# Patient Record
Sex: MALE | Race: WHITE | Employment: FULL TIME | URBAN - METROPOLITAN AREA
[De-identification: names, ages, dates, MRNs, and addresses within clinical notes are randomized per-mention and may not be internally consistent; named-entity substitution may affect disease eponyms.]

---

## 2018-01-04 ENCOUNTER — OFFICE VISIT (OUTPATIENT)
Dept: OTHER | Facility: HOSPITAL | Age: 38
End: 2018-01-04
Attending: PREVENTIVE MEDICINE

## 2018-01-04 ENCOUNTER — HOSPITAL ENCOUNTER (OUTPATIENT)
Dept: GENERAL RADIOLOGY | Facility: HOSPITAL | Age: 38
Discharge: HOME | End: 2018-01-04
Attending: PREVENTIVE MEDICINE

## 2018-01-04 DIAGNOSIS — Z00.00 ANNUAL PHYSICAL EXAM: Primary | ICD-10-CM

## 2018-01-04 LAB
ALBUMIN SERPL-MCNC: 4.4 G/DL (ref 3.5–4.8)
ALP LIVER SERPL-CCNC: 53 U/L (ref 45–117)
ALT SERPL-CCNC: 39 U/L (ref 17–63)
AST SERPL-CCNC: 21 U/L (ref 15–41)
ATRIAL RATE: 71 BPM
BASOPHILS # BLD AUTO: 0.03 X10(3) UL (ref 0–0.1)
BASOPHILS NFR BLD AUTO: 0.6 %
BILIRUB SERPL-MCNC: 0.7 MG/DL (ref 0.1–2)
BILIRUB UR QL STRIP.AUTO: NEGATIVE
BUN BLD-MCNC: 11 MG/DL (ref 8–20)
CALCIUM BLD-MCNC: 9.4 MG/DL (ref 8.3–10.3)
CHLORIDE: 104 MMOL/L (ref 101–111)
CHOLEST SMN-MCNC: 234 MG/DL (ref ?–200)
CLARITY UR REFRACT.AUTO: CLEAR
CO2: 30 MMOL/L (ref 22–32)
CREAT BLD-MCNC: 1.04 MG/DL (ref 0.7–1.3)
EOSINOPHIL # BLD AUTO: 0.08 X10(3) UL (ref 0–0.3)
EOSINOPHIL NFR BLD AUTO: 1.5 %
ERYTHROCYTE [DISTWIDTH] IN BLOOD BY AUTOMATED COUNT: 12 % (ref 11.5–16)
GLUCOSE BLD-MCNC: 91 MG/DL (ref 70–99)
GLUCOSE UR STRIP.AUTO-MCNC: NEGATIVE MG/DL
HCT VFR BLD AUTO: 48.3 % (ref 37–53)
HDLC SERPL-MCNC: 62 MG/DL (ref 45–?)
HDLC SERPL: 3.77 {RATIO} (ref ?–4.97)
HGB BLD-MCNC: 16.4 G/DL (ref 13–17)
IMMATURE GRANULOCYTE COUNT: 0.01 X10(3) UL (ref 0–1)
IMMATURE GRANULOCYTE RATIO %: 0.2 %
KETONES UR STRIP.AUTO-MCNC: NEGATIVE MG/DL
LDLC SERPL CALC-MCNC: 155 MG/DL (ref ?–130)
LEUKOCYTE ESTERASE UR QL STRIP.AUTO: NEGATIVE
LYMPHOCYTES # BLD AUTO: 2.07 X10(3) UL (ref 0.9–4)
LYMPHOCYTES NFR BLD AUTO: 38.5 %
M PROTEIN MFR SERPL ELPH: 8.2 G/DL (ref 6.1–8.3)
MCH RBC QN AUTO: 29.9 PG (ref 27–33.2)
MCHC RBC AUTO-ENTMCNC: 34 G/DL (ref 31–37)
MCV RBC AUTO: 88.1 FL (ref 80–99)
MONOCYTES # BLD AUTO: 0.46 X10(3) UL (ref 0.1–0.6)
MONOCYTES NFR BLD AUTO: 8.6 %
NEUTROPHIL ABS PRELIM: 2.72 X10 (3) UL (ref 1.3–6.7)
NEUTROPHILS # BLD AUTO: 2.72 X10(3) UL (ref 1.3–6.7)
NEUTROPHILS NFR BLD AUTO: 50.6 %
NITRITE UR QL STRIP.AUTO: NEGATIVE
NONHDLC SERPL-MCNC: 172 MG/DL (ref ?–130)
P AXIS: 50 DEGREES
P-R INTERVAL: 156 MS
PH UR STRIP.AUTO: 7 [PH] (ref 4.5–8)
PLATELET # BLD AUTO: 232 10(3)UL (ref 150–450)
POTASSIUM SERPL-SCNC: 4.2 MMOL/L (ref 3.6–5.1)
PROT UR STRIP.AUTO-MCNC: NEGATIVE MG/DL
Q-T INTERVAL: 394 MS
QRS DURATION: 84 MS
QTC CALCULATION (BEZET): 428 MS
R AXIS: 79 DEGREES
RBC # BLD AUTO: 5.48 X10(6)UL (ref 4.3–5.7)
RBC UR QL AUTO: NEGATIVE
RED CELL DISTRIBUTION WIDTH-SD: 38.6 FL (ref 35.1–46.3)
SODIUM SERPL-SCNC: 139 MMOL/L (ref 136–144)
SP GR UR STRIP.AUTO: <1.005 (ref 1–1.03)
T AXIS: 44 DEGREES
TRIGL SERPL-MCNC: 85 MG/DL (ref ?–150)
UROBILINOGEN UR STRIP.AUTO-MCNC: <2 MG/DL
VENTRICULAR RATE: 71 BPM
VLDLC SERPL CALC-MCNC: 17 MG/DL (ref 5–40)
WBC # BLD AUTO: 5.4 X10(3) UL (ref 4–13)

## 2018-01-04 PROCEDURE — 81003 URINALYSIS AUTO W/O SCOPE: CPT

## 2018-01-04 PROCEDURE — 85025 COMPLETE CBC W/AUTO DIFF WBC: CPT

## 2018-01-04 PROCEDURE — 80053 COMPREHEN METABOLIC PANEL: CPT

## 2018-01-04 PROCEDURE — 80061 LIPID PANEL: CPT

## 2018-01-05 NOTE — H&P
PATIENT'S NAME: Ocie Ill   ATTENDING PHYSICIAN: Jean Grimes M.D.    PATIENT ACCOUNT #: [de-identified] LOCATION: [address] [place] [address] RECORD #: [id number] YOB: 1980   DATE OF SERVICE: 01/04/2018       EXECUTIVE HISTORY A and no positive responses were offered. 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   He will be given copies of all his test results and advised to share these with his own physic

## 2019-10-09 ENCOUNTER — APPOINTMENT (OUTPATIENT)
Dept: OTHER | Age: 39
End: 2019-10-09
Attending: FAMILY MEDICINE

## 2020-07-17 ENCOUNTER — OFFICE VISIT (OUTPATIENT)
Dept: OCCUPATIONAL MEDICINE | Age: 40
End: 2020-07-17
Attending: PREVENTIVE MEDICINE

## 2020-07-17 DIAGNOSIS — Z00.00 ANNUAL PHYSICAL EXAM: Primary | ICD-10-CM

## 2020-07-17 LAB
ATRIAL RATE: 80 BPM
P AXIS: 67 DEGREES
P-R INTERVAL: 152 MS
Q-T INTERVAL: 372 MS
QRS DURATION: 90 MS
QTC CALCULATION (BEZET): 429 MS
R AXIS: 76 DEGREES
T AXIS: 54 DEGREES
VENTRICULAR RATE: 80 BPM

## 2021-07-27 ENCOUNTER — APPOINTMENT (OUTPATIENT)
Dept: OTHER | Age: 41
End: 2021-07-27
Attending: PREVENTIVE MEDICINE

## 2021-09-07 ENCOUNTER — OFFICE VISIT (OUTPATIENT)
Dept: OCCUPATIONAL MEDICINE | Age: 41
End: 2021-09-07
Attending: PHYSICIAN ASSISTANT

## 2022-05-20 ENCOUNTER — OFFICE VISIT (OUTPATIENT)
Dept: OCCUPATIONAL MEDICINE | Age: 42
End: 2022-05-20
Attending: PHYSICIAN ASSISTANT

## 2022-05-20 DIAGNOSIS — Z00.00 ANNUAL PHYSICAL EXAM: Primary | ICD-10-CM

## 2022-05-20 PROCEDURE — 93005 ELECTROCARDIOGRAM TRACING: CPT

## 2022-05-23 LAB
ATRIAL RATE: 72 BPM
P AXIS: 61 DEGREES
P-R INTERVAL: 158 MS
Q-T INTERVAL: 378 MS
QRS DURATION: 90 MS
QTC CALCULATION (BEZET): 413 MS
R AXIS: 83 DEGREES
T AXIS: 61 DEGREES
VENTRICULAR RATE: 72 BPM

## 2023-07-17 ENCOUNTER — OFFICE VISIT (OUTPATIENT)
Dept: OTHER | Age: 43
End: 2023-07-17
Attending: FAMILY MEDICINE

## 2023-07-17 DIAGNOSIS — Z00.00 ANNUAL PHYSICAL EXAM: Primary | ICD-10-CM

## 2023-07-17 LAB
ATRIAL RATE: 79 BPM
P AXIS: 66 DEGREES
P-R INTERVAL: 158 MS
Q-T INTERVAL: 370 MS
QRS DURATION: 84 MS
QTC CALCULATION (BEZET): 424 MS
R AXIS: 78 DEGREES
T AXIS: 59 DEGREES
VENTRICULAR RATE: 79 BPM

## 2023-07-17 PROCEDURE — 93005 ELECTROCARDIOGRAM TRACING: CPT

## 2024-07-02 ENCOUNTER — OFFICE VISIT (OUTPATIENT)
Dept: OTHER | Age: 44
End: 2024-07-02
Attending: FAMILY MEDICINE

## 2024-07-02 DIAGNOSIS — Z02.89 VISIT FOR OCCUPATIONAL HEALTH EXAMINATION: Primary | ICD-10-CM

## 2024-07-02 LAB
ATRIAL RATE: 66 BPM
P AXIS: 54 DEGREES
P-R INTERVAL: 166 MS
Q-T INTERVAL: 404 MS
QRS DURATION: 82 MS
QTC CALCULATION (BEZET): 423 MS
R AXIS: 81 DEGREES
T AXIS: 58 DEGREES
VENTRICULAR RATE: 66 BPM

## 2024-07-02 PROCEDURE — 93005 ELECTROCARDIOGRAM TRACING: CPT

## (undated) NOTE — MR AVS SNAPSHOT
After Visit Summary   1/4/2018    Vida Lyons    MRN: [id number]           Visit Information     Date & Time  1/4/2018  9:15 AM Provider  [facility]  Phone  [phone] Expires: 3/7/2018  8:00 PM    4. Enter your Zip Code and Date of Birth (mm/dd/yyyy) as indicated and click Next. You will be taken to the next sign-up page. 5. Create a [employer] Username.  This will be your [employer] login Username and cannot be changed, so t Start activities slowly and build up over time Do what you like   Get your heart pumping – brisk walking, biking, swimming Even 10 minute increments are effective and add up over the week   2 ½ hours per week – spread out over time Use a eugenio to keep you Average cost  $35*      [address]  Monday - Friday  10:00 am - 10:00 pm  Saturday - Sunday  10:00 am - 4:00 pm      [employer] [employer]  Monday - Friday  4:00 pm - 10:00 pm  Saturday - Sunday  1